# Patient Record
Sex: MALE | Employment: FULL TIME | URBAN - METROPOLITAN AREA
[De-identification: names, ages, dates, MRNs, and addresses within clinical notes are randomized per-mention and may not be internally consistent; named-entity substitution may affect disease eponyms.]

---

## 2024-01-31 ENCOUNTER — OFFICE VISIT (OUTPATIENT)
Dept: SPORTS MEDICINE | Facility: CLINIC | Age: 29
End: 2024-01-31
Payer: COMMERCIAL

## 2024-01-31 DIAGNOSIS — S43.52XA SPRAIN OF LEFT ACROMIOCLAVICULAR JOINT, INITIAL ENCOUNTER: Primary | ICD-10-CM

## 2024-01-31 PROCEDURE — 99204 OFFICE O/P NEW MOD 45 MIN: CPT | Mod: 95,,, | Performed by: ORTHOPAEDIC SURGERY

## 2024-01-31 NOTE — PROGRESS NOTES
The patient location is: home     The chief complaint leading to consultation is: see HPI     VISIT TYPE X   Virtual visit with synchronous audio and video X   Telephone E/M service        Total time spent with patient: see X raj on chart below.   More than half of the time was spent counseling or coordinating care including prognosis, differential diagnosis, risks and benefits of treatment, instructions, compliance risk reductions      EST MINUTES X   [id number] 5     [id number] 10     [id number] 15    27194 25     [id number] 40     NEW       47917 10     07832 20     [id number] 30     15528 45  X   [id number] 60     PHONE        5-10     58526 11-20     76062 21-30         NEW PATIENT    HISTORY OF PRESENT ILLNESS   28 y.o. Male with a history of AC joint pain who sustained a left AC joint injury during the football season.  I was initially asked to see him for 2nd opinion and did review his MRI which demonstrated a low-grade left AC joint sprain.  He was treated by the team physicians with a local anesthetic during the pre game.  He continues to have symptoms and presents today wanting to discuss options for treatment of his left shoulder.  He states he has still difficulty sleeping at night and reaching across his body.  He also has some pain when he is driving..          PAST MEDICAL HISTORY    No past medical history on file.    PAST SURGICAL HISTORY     No past surgical history on file.    FAMILY HISTORY    No family history on file.    SOCIAL HISTORY    Social History     Socioeconomic History    Marital status: Unknown     Social Determinants of Health     Financial Resource Strain: Low Risk  (1/31/2024)    Overall Financial Resource Strain (CARDIA)     Difficulty of Paying Living Expenses: Not hard at all   Food Insecurity: No Food Insecurity (1/31/2024)    Hunger Vital Sign     Worried About Running Out of Food in the Last Year: Never true     Ran Out of Food in the Last Year: Never true   Transportation Needs: No Transportation  Needs (1/31/2024)    PRAPARE - Transportation     Lack of Transportation (Medical): No     Lack of Transportation (Non-Medical): No   Physical Activity: Sufficiently Active (1/31/2024)    Exercise Vital Sign     Days of Exercise per Week: 4 days     Minutes of Exercise per Session: 90 min   Stress: Stress Concern Present (1/31/2024)    [employer] [place] [employer] - Occupational Stress Questionnaire     Feeling of Stress : To some extent   Social Connections: Unknown (1/31/2024)    Social Connection and Isolation Panel [NHANES]     Frequency of Communication with Friends and Family: Three times a week     Frequency of Social Gatherings with Friends and Family: Once a week     Active Member of Clubs or Organizations: Yes     Attends Club or Organization Meetings: More than 4 times per year     Marital Status: Never    Housing Stability: High Risk (1/31/2024)    Housing Stability Vital Sign     Unable to Pay for Housing in the Last Year: No     Number of Places Lived in the Last Year: 2     Unstable Housing in the Last Year: Yes       MEDICATIONS    No current outpatient medications on file.    ALLERGIES     Review of patient's allergies indicates:  Not on File      REVIEW OF SYSTEMS   Constitution: Negative. Negative for chills, fever and night sweats.   HENT: Negative for congestion and headaches.    Eyes: Negative for blurred vision, left vision loss and right vision loss.   Cardiovascular: Negative for chest pain and syncope.   Respiratory: Negative for cough and shortness of breath.    Endocrine: Negative for polydipsia, polyphagia and polyuria.   Hematologic/Lymphatic: Negative for bleeding problem. Does not bruise/bleed easily.   Skin: Negative for dry skin, itching and rash.   Musculoskeletal: Negative for falls. Positive for AC joint pain  Gastrointestinal: Negative for abdominal pain and bowel incontinence.   Genitourinary: Negative for bladder incontinence and nocturia.   Neurological:  Negative for disturbances in coordination, loss of balance and seizures.   Psychiatric/Behavioral: Negative for depression. The patient does not have insomnia.    Allergic/Immunologic: Negative for hives and persistent infections.     PHYSICAL EXAMINATION    Vitals: There were no vitals taken for this visit.    General: The patient appears active and healthy with no apparent physical problems.  No disturbance of mood or affect is demonstrated. Alert and Oriented.    Orthopaedic:     SHOULDER EXAM - left    Inspection:   Normal skin color and appearance with no scars.  No muscle atrophy noted.  No scapular winging.       ROM:      PROM:     FE - 180°    Abd/ER -  90°  Abd/IR -  45°   Add/ER -  60°     AROM:    FE - 180°    Abd/ER -  90°  Abd/IR -  45°   Add/ER -  60°         Tests:    Mild pain with a cross-body adduction    IMAGING    I reviewed a previous MRI of his left shoulder which demonstrates a low-grade sprain of his left AC joint with subcutaneous edema.    IMPRESSION     No diagnosis found.    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  I discussed options with him including a left shoulder ultrasound-guided AC joint injection.  We also discussed potentially obtaining an updated MRI if needed.  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  I will coordinate their care to get them set up with my previous practice in [place].  I recommended that the player obtain an updated MRI if needed after the injection.  I advised him that the injection will take a few days start working.  If his symptoms persist beyond a few weeks after the injection, an updated MRI would be warranted considering the MRI that I reviewed was prior to his last game of the season in which he played.  The player understood this and will follow up with me after receiving the injection and following up with Dr. Humphries.      All questions  were answered, pt will contact us for questions or concerns in the interim.